# Patient Record
Sex: MALE | Race: OTHER | Employment: FULL TIME | URBAN - METROPOLITAN AREA
[De-identification: names, ages, dates, MRNs, and addresses within clinical notes are randomized per-mention and may not be internally consistent; named-entity substitution may affect disease eponyms.]

---

## 2023-12-21 ENCOUNTER — HOSPITAL ENCOUNTER (OUTPATIENT)
Dept: PHYSICAL THERAPY | Age: 58
Setting detail: RECURRING SERIES
Discharge: HOME | End: 2023-12-24
Payer: COMMERCIAL

## 2023-12-21 DIAGNOSIS — M25.571 PAIN IN RIGHT ANKLE AND JOINTS OF RIGHT FOOT: Primary | ICD-10-CM

## 2023-12-21 DIAGNOSIS — R26.2 DIFFICULTY IN WALKING, NOT ELSEWHERE CLASSIFIED: ICD-10-CM

## 2023-12-21 PROCEDURE — 97750 PHYSICAL PERFORMANCE TEST: CPT

## 2023-12-27 NOTE — THERAPY EVALUATION
Satish Quiles  : 1965  Primary: [employer]  Secondary:  [facility]  [id number] [facility] [address]  Phone: [phone]  Fax: [phone] Plan Frequency: 1 x only           Plan of Care/Certification Expiration Date:     Frequency/Duration:   Plan Frequency: 1 x only      Time In/Out:   Time In: 915  Time Out: 924      PT Visit Info:    Plan Frequency: 1 x only  Total # of Visits Approved: 1  Total # of Visits to Date: 1      Visit Count:  1                OUTPATIENT PHYSICAL THERAPY:             Initial Assessment and FCE  2023               Episode (FCE)    present throughout testing today      Treatment Diagnosis:     Pain in right ankle and joints of right foot  Difficulty in walking, not elsewhere classified  Medical/Referring Diagnosis:    Displaced trimalleolar fracture of right lower leg, initial encounter for closed fracture [R77.636Y]    Referring Physician:  Nely Mullins MD MD Orders:  PT Eval and Treat FCE  Return MD Appt:  1-3-24  Date of Onset:  Onset Date: 23     Allergies:  Patient has no allergy information on record. Medications Last Reviewed:  2023     SUBJECTIVE   History of Injury/Illness (Reason for Referral):  [facility] reports that he slipped in wet grass while using weed heather 23. X-rays indicated trimalleolar fracture right ankle. He was casted until surgery on 23 by Dr. Catherine Rivera. He reports continued pain and swelling post op. Initially, Mr. Jordyn Rizo reported only 3 sessions of PT but later clarified 3 rounds of PT totaling about 25 visits. More therapy was \"recommended\" , but not approved. He continues to report near constant pain and swelling with any activity. It is also noted that patient states that he injured his neck and shoulders in fall, but no treatment to these areas. Patient is now ordered for an FCE.   Patient Stated Goal(s):  \"regain strength and

## 2024-10-24 ENCOUNTER — HOSPITAL ENCOUNTER (EMERGENCY)
Age: 59
Discharge: HOME | End: 2024-10-25
Attending: EMERGENCY MEDICINE

## 2024-10-24 ENCOUNTER — APPOINTMENT (OUTPATIENT)
Dept: CT IMAGING | Age: 59
End: 2024-10-24

## 2024-10-24 DIAGNOSIS — M54.50 ACUTE RIGHT-SIDED LOW BACK PAIN WITHOUT SCIATICA: Primary | ICD-10-CM

## 2024-10-24 DIAGNOSIS — R10.9 ACUTE RIGHT FLANK PAIN: ICD-10-CM

## 2024-10-24 LAB
APPEARANCE UR: CLEAR
BILIRUB UR QL: NEGATIVE
COLOR UR: YELLOW
GLUCOSE BLD STRIP.AUTO-MCNC: 219 MG/DL (ref 65–100)
GLUCOSE UR STRIP.AUTO-MCNC: >1000 MG/DL
HGB UR QL STRIP: NEGATIVE
KETONES UR QL STRIP.AUTO: NEGATIVE MG/DL
LEUKOCYTE ESTERASE UR QL STRIP.AUTO: NEGATIVE
NITRITE UR QL STRIP.AUTO: NEGATIVE
PH UR STRIP: 7 (ref 5–9)
PROT UR STRIP-MCNC: NEGATIVE MG/DL
SERVICE CMNT-IMP: ABNORMAL
SP GR UR REFRACTOMETRY: 1.01 (ref 1–1.02)
UROBILINOGEN UR QL STRIP.AUTO: 0.2 EU/DL (ref 0.2–1)

## 2024-10-24 PROCEDURE — 6370000000 HC RX 637 (ALT 250 FOR IP): Performed by: EMERGENCY MEDICINE

## 2024-10-24 PROCEDURE — 74176 CT ABD & PELVIS W/O CONTRAST: CPT

## 2024-10-24 PROCEDURE — 82962 GLUCOSE BLOOD TEST: CPT

## 2024-10-24 PROCEDURE — 99284 EMERGENCY DEPT VISIT MOD MDM: CPT

## 2024-10-24 PROCEDURE — 81003 URINALYSIS AUTO W/O SCOPE: CPT

## 2024-10-24 RX ORDER — TRAMADOL HYDROCHLORIDE 50 MG/1
50 TABLET ORAL EVERY 6 HOURS PRN
Qty: 12 TABLET | Refills: 0 | Status: SHIPPED | OUTPATIENT
Start: 2024-10-24 | End: 2024-10-27

## 2024-10-24 RX ORDER — METHOCARBAMOL 750 MG/1
750 TABLET, FILM COATED ORAL 4 TIMES DAILY PRN
Qty: 28 TABLET | Refills: 0 | Status: SHIPPED | OUTPATIENT
Start: 2024-10-24

## 2024-10-24 RX ORDER — ATORVASTATIN CALCIUM 20 MG/1
20 TABLET, FILM COATED ORAL DAILY
COMMUNITY

## 2024-10-24 RX ORDER — HYDROCODONE BITARTRATE AND ACETAMINOPHEN 5; 325 MG/1; MG/1
1 TABLET ORAL
Status: COMPLETED | OUTPATIENT
Start: 2024-10-24 | End: 2024-10-24

## 2024-10-24 RX ADMIN — HYDROCODONE BITARTRATE AND ACETAMINOPHEN 1 TABLET: 5; 325 TABLET ORAL at 23:01

## 2024-10-24 ASSESSMENT — PAIN - FUNCTIONAL ASSESSMENT
PAIN_FUNCTIONAL_ASSESSMENT: 0-10
PAIN_FUNCTIONAL_ASSESSMENT: ACTIVITIES ARE NOT PREVENTED

## 2024-10-24 ASSESSMENT — PAIN DESCRIPTION - DESCRIPTORS: DESCRIPTORS: SHARP;SHOOTING

## 2024-10-24 ASSESSMENT — PAIN SCALES - GENERAL
PAINLEVEL_OUTOF10: 8
PAINLEVEL_OUTOF10: 8

## 2024-10-24 ASSESSMENT — PAIN DESCRIPTION - LOCATION: LOCATION: BACK

## 2024-10-24 ASSESSMENT — LIFESTYLE VARIABLES: HOW MANY STANDARD DRINKS CONTAINING ALCOHOL DO YOU HAVE ON A TYPICAL DAY: PATIENT DOES NOT DRINK

## 2024-10-24 ASSESSMENT — PAIN DESCRIPTION - PAIN TYPE: TYPE: ACUTE PAIN

## 2024-10-24 ASSESSMENT — PAIN DESCRIPTION - FREQUENCY: FREQUENCY: INTERMITTENT

## 2024-10-25 VITALS
DIASTOLIC BLOOD PRESSURE: 78 MMHG | OXYGEN SATURATION: 98 % | SYSTOLIC BLOOD PRESSURE: 128 MMHG | HEART RATE: 82 BPM | WEIGHT: 154.32 LBS | BODY MASS INDEX: 25.71 KG/M2 | HEIGHT: 65 IN | RESPIRATION RATE: 18 BRPM | TEMPERATURE: 98.4 F

## 2024-10-25 NOTE — ED TRIAGE NOTES
Pt states that he has had back pain x 1 month.  States that the pain is worse with movement and whenever he urinates.  Has not been seen for this problem

## 2024-10-25 NOTE — ED PROVIDER NOTES
Emergency Department Provider Note       PCP: Unknown, Provider   Age: 59 y.o.   Sex: male     DISPOSITION Decision To Discharge 10/24/2024 11:49:02 PM    ICD-10-CM    1. Acute right-sided low back pain without sciatica  M54.50 traMADol (ULTRAM) 50 MG tablet      2. Acute right flank pain  R10.9 traMADol (ULTRAM) 50 MG tablet          Medical Decision Making     59-year-old  male presents to the emergency department complaining of right flank pain for the last 30 days, progressively worsening, comes and goes in intensity and is not normally associated with any activity although he does state walking and certain turns make it worse as well.  He does describe some increased urinary frequency as well as increased pain with urination.  He denies any difficulty with urination.  Denies any change in bowel habits, melena or hematochezia.  No history of similar pain in the past and no history of trauma.  On exam, the patient is mild tenderness to the right flank and right CVA area.  UA only positive for glucose greater than thousand.  POC glucose was checked and it was 219.  Patient is currently on metformin 1000 mg twice daily.  Due to his persistent discomfort a CT was obtained which revealed no evidence of hydronephrosis but did reveal small stone in the proximity of the distal ureter.  At this time the plan would be to treat the patient symptomatically with some for pain and a muscle relaxant and have her followed up with his doctor next week if symptoms persist consider CT urogram.     1 or more acute illnesses that pose a threat to life or bodily function.   Prescription drug management performed.  I independently ordered and reviewed each unique test.           I interpreted the CT Scan CT renal protocol revealed no evidence of obstruction, but a questionable stone at the right UVJ..              History     59-year-old  male presents to the emergency department complaining of right flank pain for

## 2024-10-25 NOTE — DISCHARGE INSTRUCTIONS
We would love to help you get a primary care doctor for follow-up after your emergency department visit.    Please call [phone] between 7AM - 6PM Monday to Friday.  A care navigator will be able to assist you with setting up a doctor close to your home.